# Patient Record
Sex: MALE | Race: WHITE | NOT HISPANIC OR LATINO | Employment: OTHER | URBAN - METROPOLITAN AREA
[De-identification: names, ages, dates, MRNs, and addresses within clinical notes are randomized per-mention and may not be internally consistent; named-entity substitution may affect disease eponyms.]

---

## 2017-07-19 DIAGNOSIS — E06.3 HASHIMOTO'S THYROIDITIS: ICD-10-CM

## 2017-07-19 RX ORDER — LEVOTHYROXINE SODIUM 112 UG/1
TABLET ORAL
Qty: 30 TABLET | Refills: 0 | Status: SHIPPED | OUTPATIENT
Start: 2017-07-19 | End: 2017-08-09 | Stop reason: SDUPTHER

## 2017-08-09 DIAGNOSIS — E06.3 HASHIMOTO'S THYROIDITIS: ICD-10-CM

## 2017-08-09 RX ORDER — LEVOTHYROXINE SODIUM 112 UG/1
TABLET ORAL
Qty: 90 TABLET | Refills: 0 | Status: SHIPPED | OUTPATIENT
Start: 2017-08-09 | End: 2018-02-09 | Stop reason: SDUPTHER

## 2018-02-09 DIAGNOSIS — E06.3 HASHIMOTO'S THYROIDITIS: ICD-10-CM

## 2018-02-09 RX ORDER — LEVOTHYROXINE SODIUM 112 UG/1
TABLET ORAL
Qty: 90 TABLET | Refills: 0 | Status: SHIPPED | OUTPATIENT
Start: 2018-02-09 | End: 2018-04-29 | Stop reason: SDUPTHER

## 2018-04-29 DIAGNOSIS — E06.3 HASHIMOTO'S THYROIDITIS: ICD-10-CM

## 2018-04-30 RX ORDER — LEVOTHYROXINE SODIUM 112 MCG
TABLET ORAL
Qty: 90 TABLET | Refills: 0 | Status: SHIPPED | OUTPATIENT
Start: 2018-04-30 | End: 2018-07-28 | Stop reason: SDUPTHER

## 2018-07-28 DIAGNOSIS — E06.3 HASHIMOTO'S THYROIDITIS: ICD-10-CM

## 2018-07-30 RX ORDER — LEVOTHYROXINE SODIUM 112 MCG
TABLET ORAL
Qty: 90 TABLET | Refills: 0 | Status: SHIPPED | OUTPATIENT
Start: 2018-07-30 | End: 2020-02-06

## 2018-10-14 ENCOUNTER — APPOINTMENT (OUTPATIENT)
Dept: GENERAL RADIOLOGY | Facility: HOSPITAL | Age: 53
End: 2018-10-14

## 2018-10-14 ENCOUNTER — HOSPITAL ENCOUNTER (EMERGENCY)
Facility: HOSPITAL | Age: 53
Discharge: HOME | End: 2018-10-14
Attending: EMERGENCY MEDICINE

## 2018-10-14 VITALS
RESPIRATION RATE: 18 BRPM | HEART RATE: 55 BPM | HEIGHT: 72 IN | OXYGEN SATURATION: 99 % | WEIGHT: 260 LBS | DIASTOLIC BLOOD PRESSURE: 74 MMHG | TEMPERATURE: 98.4 F | SYSTOLIC BLOOD PRESSURE: 138 MMHG | BODY MASS INDEX: 35.21 KG/M2

## 2018-10-14 DIAGNOSIS — S52.501A CLOSED FRACTURE OF DISTAL END OF RIGHT RADIUS, UNSPECIFIED FRACTURE MORPHOLOGY, INITIAL ENCOUNTER: Primary | ICD-10-CM

## 2018-10-14 PROCEDURE — 96374 THER/PROPH/DIAG INJ IV PUSH: CPT

## 2018-10-14 PROCEDURE — 96375 TX/PRO/DX INJ NEW DRUG ADDON: CPT

## 2018-10-14 PROCEDURE — 73130 X-RAY EXAM OF HAND: CPT

## 2018-10-14 PROCEDURE — 25010000002 HYDROMORPHONE 1 MG/ML SOLUTION: Performed by: NURSE PRACTITIONER

## 2018-10-14 PROCEDURE — 99284 EMERGENCY DEPT VISIT MOD MDM: CPT

## 2018-10-14 PROCEDURE — 73100 X-RAY EXAM OF WRIST: CPT

## 2018-10-14 PROCEDURE — 25010000002 ONDANSETRON PER 1 MG: Performed by: NURSE PRACTITIONER

## 2018-10-14 PROCEDURE — 73110 X-RAY EXAM OF WRIST: CPT

## 2018-10-14 PROCEDURE — 25010000002 MORPHINE PER 10 MG: Performed by: NURSE PRACTITIONER

## 2018-10-14 RX ORDER — BUPIVACAINE HYDROCHLORIDE 5 MG/ML
10 INJECTION, SOLUTION EPIDURAL; INTRACAUDAL ONCE
Status: COMPLETED | OUTPATIENT
Start: 2018-10-14 | End: 2018-10-14

## 2018-10-14 RX ORDER — HYDROCODONE BITARTRATE AND ACETAMINOPHEN 7.5; 325 MG/1; MG/1
1 TABLET ORAL EVERY 6 HOURS PRN
Qty: 12 TABLET | Refills: 0 | Status: SHIPPED | OUTPATIENT
Start: 2018-10-14 | End: 2020-02-06

## 2018-10-14 RX ORDER — ONDANSETRON 2 MG/ML
4 INJECTION INTRAMUSCULAR; INTRAVENOUS ONCE
Status: COMPLETED | OUTPATIENT
Start: 2018-10-14 | End: 2018-10-14

## 2018-10-14 RX ORDER — LIDOCAINE HYDROCHLORIDE 10 MG/ML
10 INJECTION, SOLUTION EPIDURAL; INFILTRATION; INTRACAUDAL; PERINEURAL ONCE
Status: COMPLETED | OUTPATIENT
Start: 2018-10-14 | End: 2018-10-14

## 2018-10-14 RX ORDER — ONDANSETRON 4 MG/1
4 TABLET, FILM COATED ORAL EVERY 8 HOURS PRN
Qty: 10 TABLET | Refills: 0 | Status: SHIPPED | OUTPATIENT
Start: 2018-10-14 | End: 2020-02-06

## 2018-10-14 RX ORDER — HYDROCODONE BITARTRATE AND ACETAMINOPHEN 7.5; 325 MG/1; MG/1
1 TABLET ORAL ONCE
Status: COMPLETED | OUTPATIENT
Start: 2018-10-14 | End: 2018-10-14

## 2018-10-14 RX ADMIN — LIDOCAINE HYDROCHLORIDE 5 ML: 10 INJECTION, SOLUTION EPIDURAL; INFILTRATION; INTRACAUDAL; PERINEURAL at 17:47

## 2018-10-14 RX ADMIN — ONDANSETRON 4 MG: 2 INJECTION INTRAMUSCULAR; INTRAVENOUS at 16:58

## 2018-10-14 RX ADMIN — HYDROMORPHONE HYDROCHLORIDE 1 MG: 1 INJECTION, SOLUTION INTRAMUSCULAR; INTRAVENOUS; SUBCUTANEOUS at 17:44

## 2018-10-14 RX ADMIN — BUPIVACAINE HYDROCHLORIDE 10 ML: 5 INJECTION, SOLUTION EPIDURAL; INTRACAUDAL; PERINEURAL at 17:46

## 2018-10-14 RX ADMIN — MORPHINE SULFATE 4 MG: 4 INJECTION INTRAVENOUS at 16:58

## 2018-10-14 RX ADMIN — HYDROCODONE BITARTRATE AND ACETAMINOPHEN 1 TABLET: 7.5; 325 TABLET ORAL at 18:26

## 2018-10-14 NOTE — DISCHARGE INSTRUCTIONS
ICE, ICE, ICE on and off until you see Hand Surgeon    Use sling to help with comfort    Can take over the counter Ibuprofen as needed for pain in between pain medication doses    No driving on pain meds    Return Precautions    Although you are being discharged from the ED today, I encourage you to return for worsening symptoms.  Things can, and do, change such that treatment at home with medication may not be adequate.      Specifically, return for any of the following:    Chest pain, shortness of breath, pain or nausea and vomiting not controlled by medications provided.    Please make a follow up with your Primary Care Provider for a blood pressure recheck.     YOU HAVE BEEN PRESCRIBED NARCOTIC PAIN MEDICATION    Narcotic pain medications can be addicting; only take them when needed    You have only been given a 2-3 day supply    Do not operate heavy machinery or make important decisions while taking narcotics    Take an over the counters tool softener while taking pain pills to avoid constipation    Do not consume alcohol while taking pain medication as this can be fatal    Do not share your pain medication with others    Store pain medication securely to prevent accidental exposure or death

## 2018-10-14 NOTE — ED PROVIDER NOTES
Pt presents to the ED complaining of R wrist pain that began s/p fall while roller skating earlier today. Pt's R wrist XR shows a fracture involving the distal radial metaphysis. On exam, the pt has an obvious deformity to the R wrist but is NVI distally. I agree with the plan to have NP Gael reduce the pt's R wrist prior to discharging the pt home with a referral to Dr. Fraga (hand surgery).    MD ATTESTATION NOTE    The KASSIDY and I have discussed this patient's history, physical exam, and treatment plan.  I have reviewed the documentation and personally had a face to face interaction with the patient. I affirm the documentation and agree with the treatment and plan.  The attached note describes my personal findings.    Documentation assistance provided by delano Rivera and Brianna Colin for . Information recorded by the scribe was done at my direction and has been verified and validated by me.    Brianna Colin  10/14/18 1746     Damaris Rivera  10/14/18 2006       Zhou Campoverde MD  10/15/18 8088

## 2018-10-14 NOTE — ED PROVIDER NOTES
EMERGENCY DEPARTMENT ENCOUNTER    Room Number:  35/35  Date seen:  10/14/2018  Time seen: 4:44 PM  PCP: Aristides Hobson MD    HPI:  Chief complaint: Arm pain  Context:Tristen Markham is a 53 y.o. male who presents to the ED with c/o sudden R lower arm pain that began PTA when pt fell while roller skating.     Timing: Constant  Duration: Began PTA  Location: R lower arm  Quality: Sudden onset  Intensity/Severity: Moderate  Associated Symptoms: None  Previous Episodes: None  Treatment before arrival: None    ALLERGIES  Patient has no known allergies.    PAST MEDICAL HISTORY  Active Ambulatory Problems     Diagnosis Date Noted   • Hashimoto's thyroiditis    • History of herpes genitalis    • History of onychomycosis    • Vitamin D deficiency    • External hemorrhoids 08/02/2016   • Dyslipidemia 08/02/2016   • Non morbid obesity due to excess calories 08/02/2016   • Healthcare maintenance 08/02/2016   • Papule of skin 10/04/2016     Resolved Ambulatory Problems     Diagnosis Date Noted   • Adult hypothyroidism 08/02/2016   • Accelerated idioventricular rhythm (CMS/HCC)      Past Medical History:   Diagnosis Date   • Abnormal Holter monitor finding    • Closed fracture of tibia and fibula    • Fecal occult blood test positive    • History of Hashimoto thyroiditis    • History of herpes genitalis    • History of onychomycosis    • Non morbid obesity due to excess calories 8/2/2016   • Vitamin D deficiency        PAST SURGICAL HISTORY  Past Surgical History:   Procedure Laterality Date   • TIBIA FRACTURE SURGERY  2009    titanium clau       FAMILY HISTORY  Family History   Problem Relation Age of Onset   • Breast cancer Mother    • Heart disease Father         MI at 65       SOCIAL HISTORY  Social History     Social History   • Marital status:      Spouse name: N/A   • Number of children: 1   • Years of education: N/A     Occupational History   •       Independent     Social History Main Topics   •  "Smoking status: Former Smoker   • Smokeless tobacco: Never Used      Comment: 1 ppd 18-35; quit 2005   • Alcohol use Yes      Comment: 1 drink/ month   • Drug use: No   • Sexual activity: Defer      Comment: wife only; hx of herpes     Other Topics Concern   • Not on file     Social History Narrative    Diet - \"slack a little bit, a burger junky\"; eating more veggies    Exercise - \"trying to get more\"; hiking 3x/ week       REVIEW OF SYSTEMS  Review of Systems   Constitutional: Negative for activity change, appetite change, diaphoresis and fever.   HENT: Negative for trouble swallowing.    Eyes: Negative for visual disturbance.   Respiratory: Negative for cough, chest tightness, shortness of breath and wheezing.    Cardiovascular: Negative for chest pain, palpitations and leg swelling.   Gastrointestinal: Negative for abdominal pain, diarrhea, nausea and vomiting.   Genitourinary: Negative for dysuria.   Musculoskeletal: Positive for myalgias (R lower arm pain). Negative for back pain.   Skin: Negative for rash.   Allergic/Immunologic: Negative for food allergies.   Neurological: Negative for dizziness, speech difficulty and light-headedness.       PHYSICAL EXAM  ED Triage Vitals [10/14/18 1636]   Temp Heart Rate Resp BP SpO2   99.2 °F (37.3 °C) 111 16 -- 98 %      Temp src Heart Rate Source Patient Position BP Location FiO2 (%)   Tympanic -- -- -- --     Physical Exam   Constitutional: He is oriented to person, place, and time and well-developed, well-nourished, and in no distress. No distress.   HENT:   Head: Normocephalic and atraumatic.   Eyes: Pupils are equal, round, and reactive to light.   Neck: Normal range of motion. Neck supple.   Cardiovascular: Normal rate, S1 normal, S2 normal and normal heart sounds.  Exam reveals no gallop and no friction rub.    No murmur heard.  Pulmonary/Chest: Effort normal. No respiratory distress. He has no wheezes. He has no rales. He exhibits no tenderness.   Abdominal: Soft. " There is no rebound and no guarding.   Musculoskeletal: Normal range of motion. He exhibits deformity (at R DR).   Lymphadenopathy:     He has no cervical adenopathy.   Neurological: He is alert and oriented to person, place, and time. He has normal sensation and intact cranial nerves.   Skin: Skin is warm and dry.   Psychiatric: Affect normal.   Nursing note and vitals reviewed.      RADIOLOGY  XR Wrist 2 View Right (post- reduction)   Final Result    FINDINGS: There is splinting material in position. The degree of  fracture fragment displacement is only slightly improved compared to the  prior examination. No new abnormality identified.        XR Wrist 3+ View Right (pre-reduction)   Final Result    RIGHT HAND AND WRIST FINDINGS: There is a comminuted fracture involving  the distal right radial metaphysis. The distal radial shaft is displaced  in a ventral direction in relation to the fracture fragments. The  radiocarpal articulation is preserved. Curvilinear calcific density  within the soft tissues adjacent to the triquetrum, suggesting fracture.  The remaining carpal bones have a satisfactory appearance. No metacarpal  or phalangeal fracture/dislocation. Soft tissue swelling about the  wrist, no soft tissue gas nor radiopaque foreign body.     CONCLUSION:  1. Fracture involving the distal radial metaphysis, the distal ulna is  satisfactory in appearance.  2. Soft tissue calcification along the triquetrum, likely represents  underlying fracture.      XR Hand 3+ View Right   Final Result    RIGHT HAND AND WRIST FINDINGS: There is a comminuted fracture involving  the distal right radial metaphysis. The distal radial shaft is displaced  in a ventral direction in relation to the fracture fragments. The  radiocarpal articulation is preserved. Curvilinear calcific density  within the soft tissues adjacent to the triquetrum, suggesting fracture.  The remaining carpal bones have a satisfactory appearance. No  metacarpal  or phalangeal fracture/dislocation. Soft tissue swelling about the  wrist, no soft tissue gas nor radiopaque foreign body.     CONCLUSION:  1. Fracture involving the distal radial metaphysis, the distal ulna is  satisfactory in appearance.  2. Soft tissue calcification along the triquetrum, likely represents  underlying fracture.          I ordered the above noted radiological studies and reviewed the images on the PACS system.          MEDICATIONS GIVEN IN ER  Medications   morphine injection 4 mg (4 mg Intravenous Given 10/14/18 1658)   ondansetron (ZOFRAN) injection 4 mg (4 mg Intravenous Given 10/14/18 1658)   lidocaine PF 1% (XYLOCAINE) injection 10 mL (5 mL Infiltration Given by Other 10/14/18 1747)   bupivacaine (PF) (MARCAINE) 0.5 % injection 10 mL (10 mL Injection Given by Other 10/14/18 1746)   HYDROmorphone (DILAUDID) injection 1 mg (1 mg Intravenous Given 10/14/18 1744)   HYDROcodone-acetaminophen (NORCO) 7.5-325 MG per tablet 1 tablet (1 tablet Oral Given 10/14/18 1826)       PROCEDURES  FX Dislocation  Date/Time: 10/14/2018 6:21 PM  Performed by: ELI FISHER  Authorized by: ZENAIDA ROSALES     Consent:     Consent given by:  Patient  Injury:     Injury location:  Forearm    Forearm injury location:  R forearm    Forearm fracture type: distal radial    Pre-procedure assessment:     Neurological function: normal      Distal perfusion: normal      Range of motion: reduced    Anesthesia (see MAR for exact dosages):     Anesthesia method:  Nerve block    Block needle gauge:  27 G    Block anesthetic:  Lidocaine 1% w/o epi (and marcaine 0.5% w/o epi)    Block injection procedure:  Anatomic landmarks identified, introduced needle, anatomic landmarks palpated and negative aspiration for blood    Block outcome:  Anesthesia achieved  Procedure details:     Manipulation performed: yes      Skin traction used: no      Skeletal traction used: yes      Pin inserted: no      Reduction successful:  improved.      Immobilization:  Splint    Splint type:  Thumb spica    Supplies used:  Ortho-Glass (and ACE wrap)  Post-procedure assessment:     Neurological function: normal      Distal perfusion: normal      Range of motion: improved      Patient tolerance of procedure:  Tolerated well, no immediate complications  Splint - Cast - Strapping  Date/Time: 10/14/2018 6:24 PM  Performed by: ELI FISHER  Authorized by: ZENAIDA CAMPOVERDE     Consent:     Consent obtained:  Verbal    Consent given by:  Patient    Risks discussed:  Discoloration, numbness, pain and swelling  Pre-procedure details:     Sensation:  Normal  Procedure details:     Laterality:  Right    Location:  Hand    Hand:  R hand    Strapping: no      Splint type:  Thumb spica    Supplies:  Ortho-Glass (and ACE wrap)  Post-procedure details:     Pain:  Improved    Sensation:  Normal    Patient tolerance of procedure:  Tolerated well, no immediate complications        COURSE & MEDICAL DECISION MAKING  Pertinent Imaging studies that were ordered and reviewed are noted above.  Results were reviewed/discussed with the patient and they were also made aware of online access.  Pt also made aware that some labs, such as cultures, will not be resulted during ER visit and follow up with PMD is necessary.     PROGRESS AND CONSULTS    Progress Notes:  1647 Ordered XR R hand and XR R wrist for further evaluation. Ordered morphine for pain and zofran for nausea.    6:27 PM:  Pt's right hand/right wrist X-Ray showed a fracture involving the distal radial metaphysis. Pt underwent reduction of the distal radial fracture and a splint was subsequently applied. Pt will be prescribed with rx for small amount of pain medicine for discomfort and antiemetic. Pt will be provided with f/u referral to the hand surgeon. RTER warnings given. Pt will be discharged.     Reviewed pt's history and workup with Dr. Campoverde.  After a bedside evaluation, Dr. Campoverde agrees with the plan of  "care.    The patient's history, physical exam, and lab findings were discussed with the physician, who also performed a face to face history and physical exam.  I discussed all results and noted any abnormalities with patient.  Discussed absoute need to recheck abnormalities with their family physician.  I answered any of the patient's questions.  Discussed plan for discharge, as there is no emergent indication for admission.  Pt is agreeable and understands need for follow up and repeat testing.  Pt is aware that discharge does not mean that nothing is wrong but it indicates no emergency is present and they must continue care with their family physician.  Pt is discharged with instructions to follow up with primary care doctor to have their blood pressure rechecked.       Disposition vitals:  /80   Pulse 50   Temp 99.2 °F (37.3 °C) (Tympanic)   Resp 16   Ht 182.9 cm (72\")   Wt 118 kg (260 lb)   SpO2 99%   BMI 35.26 kg/m²     DIAGNOSIS  Final diagnoses:   Closed fracture of distal end of right radius, unspecified fracture morphology, initial encounter       FOLLOW UP   Michael Fraga MD  [address] [place], [place] [id number]  [phone]      Call on Monday for Tuesday appointment at [facility] location      RX     Medication List      New Prescriptions    HYDROcodone-acetaminophen 7.5-325 MG per tablet  Commonly known as:  NORCO  Take 1 tablet by mouth Every 6 (Six) Hours As Needed for Moderate Pain .     ondansetron 4 MG tablet  Commonly known as:  ZOFRAN  Take 1 tablet by mouth Every 8 (Eight) Hours As Needed for Nausea. Take if   pain medication causes nausea          Rachel Report  Pt's RACHEL is unavailable.    Documentation assistance provided by lachelle Mariee and Gisela Prater for VENANCIO Otero.  Information recorded by the lachelle was done at my direction and has been verified and validated by me.       Gisela Prater  10/14/18 1839       Mila Mayo APRN  10/14/18 " 2019

## 2018-10-26 DIAGNOSIS — E06.3 HASHIMOTO'S THYROIDITIS: ICD-10-CM

## 2018-10-26 RX ORDER — LEVOTHYROXINE SODIUM 112 MCG
TABLET ORAL
Qty: 90 TABLET | Refills: 0 | Status: SHIPPED | OUTPATIENT
Start: 2018-10-26 | End: 2019-01-24 | Stop reason: SDUPTHER

## 2019-01-24 DIAGNOSIS — E06.3 HASHIMOTO'S THYROIDITIS: ICD-10-CM

## 2019-01-24 RX ORDER — LEVOTHYROXINE SODIUM 112 MCG
TABLET ORAL
Qty: 90 TABLET | Refills: 0 | Status: SHIPPED | OUTPATIENT
Start: 2019-01-24 | End: 2020-02-06

## 2019-04-24 DIAGNOSIS — E06.3 HASHIMOTO'S THYROIDITIS: ICD-10-CM

## 2019-04-24 RX ORDER — LEVOTHYROXINE SODIUM 112 MCG
TABLET ORAL
Qty: 90 TABLET | Refills: 0 | OUTPATIENT
Start: 2019-04-24

## 2020-02-03 DIAGNOSIS — E06.3 HASHIMOTO'S THYROIDITIS: Primary | ICD-10-CM

## 2020-02-04 LAB
T4 FREE SERPL-MCNC: 1.15 NG/DL (ref 0.93–1.7)
TSH SERPL DL<=0.005 MIU/L-ACNC: 7.8 UIU/ML (ref 0.27–4.2)

## 2020-02-06 ENCOUNTER — OFFICE VISIT (OUTPATIENT)
Dept: INTERNAL MEDICINE | Facility: CLINIC | Age: 55
End: 2020-02-06

## 2020-02-06 VITALS
BODY MASS INDEX: 31.29 KG/M2 | HEART RATE: 54 BPM | HEIGHT: 77 IN | OXYGEN SATURATION: 96 % | TEMPERATURE: 97.8 F | DIASTOLIC BLOOD PRESSURE: 90 MMHG | WEIGHT: 265 LBS | SYSTOLIC BLOOD PRESSURE: 130 MMHG

## 2020-02-06 DIAGNOSIS — E66.09 NON MORBID OBESITY DUE TO EXCESS CALORIES: ICD-10-CM

## 2020-02-06 DIAGNOSIS — E06.3 HASHIMOTO'S THYROIDITIS: Primary | ICD-10-CM

## 2020-02-06 DIAGNOSIS — R03.0 ELEVATED BLOOD PRESSURE READING WITHOUT DIAGNOSIS OF HYPERTENSION: ICD-10-CM

## 2020-02-06 DIAGNOSIS — Z00.00 HEALTHCARE MAINTENANCE: ICD-10-CM

## 2020-02-06 PROCEDURE — 99214 OFFICE O/P EST MOD 30 MIN: CPT | Performed by: INTERNAL MEDICINE

## 2020-02-06 RX ORDER — LEVOTHYROXINE SODIUM 0.12 MG/1
125 TABLET ORAL DAILY
Qty: 30 TABLET | Refills: 5 | Status: SHIPPED | OUTPATIENT
Start: 2020-02-06 | End: 2020-03-13 | Stop reason: SDUPTHER

## 2020-02-06 NOTE — PROGRESS NOTES
"Hashimoto's Thyroiditis and Labs Only      HPI  Tristen Markham is a 54 y.o. male RTC in f/u:  Has been absent from care since 2016.     1. Hypothyroidism -out of meds for last few days. Has been regular on meds.  Is on 112mcg daily. Had labs prior to today, but no other labs in a while.  Taking daily on empty stomach.  Will take with Metamucil daily.   2. External hemorrhoids - controlled on fiber daily. Feels like does well with taking fiber.   3. Obesity - has been working on increasing diet and exercise efforts. Has lost 8-9 # in last few months. Heating better. Sodas are out. Cooking at home.  Exercise is cardio with elliptical daily, but will miss a few days.  Feels like has been trying to get in better shape. Over the 4 years has been stable. NO labs done elsewhere.   4. HM - Flu \"I dont usually get it\".  Has not had any issues; is not aware of Shingrix vaccine.    Review of Systems   Constitution: Positive for weight loss (in last few weeks, has been eating better and exercising. ). Negative for chills, fever and malaise/fatigue.   Musculoskeletal: Negative for muscle weakness.   Gastrointestinal: Negative for constipation, diarrhea, nausea and vomiting.       The following portions of the patient's history were reviewed and updated as appropriate: allergies, current medications, past medical history, past social history and problem list.      Current Outpatient Medications:   •  psyllium (METAMUCIL) 58.6 % powder, Take  by mouth daily., Disp: , Rfl: 12  •  levothyroxine (SYNTHROID) 125 MCG tablet, Take 1 tablet by mouth Daily., Disp: 30 tablet, Rfl: 5    Vitals:    02/06/20 0927   BP: 130/90   Pulse: 54   Temp: 97.8 °F (36.6 °C)   SpO2: 96%   Weight: 120 kg (265 lb)   Height: 195.6 cm (77\")     Body mass index is 31.42 kg/m².    Recheck B/P: 124/ 90    Physical Exam   Constitutional: He is oriented to person, place, and time. He appears well-developed and well-nourished. No distress.   HENT:   Head: " Normocephalic and atraumatic.   Mouth/Throat: Oropharynx is clear and moist. No oropharyngeal exudate.   Eyes: Pupils are equal, round, and reactive to light.   Neck: Normal range of motion. Neck supple. Carotid bruit is not present. No thyromegaly present.   Cardiovascular: Normal rate, regular rhythm and normal heart sounds.   Pulses:       Carotid pulses are 2+ on the right side, and 2+ on the left side.       Radial pulses are 2+ on the right side, and 2+ on the left side.   Pulmonary/Chest: Effort normal and breath sounds normal. No respiratory distress. He has no wheezes. He has no rales.   Musculoskeletal: He exhibits no edema.   Neurological: He is alert and oriented to person, place, and time. No cranial nerve deficit.   Psychiatric: He has a normal mood and affect. His behavior is normal.   Vitals reviewed.      Assessment/ Plan  Diagnoses and all orders for this visit:    Hashimoto's thyroiditis  -     levothyroxine (SYNTHROID) 125 MCG tablet; Take 1 tablet by mouth Daily.    Non morbid obesity due to excess calories    Healthcare maintenance    Elevated blood pressure reading without diagnosis of hypertension        Return in about 3 months (around 5/6/2020) for Annual physical.      Discussion:  Tristen Markham is a 54 y.o. male RTC in f/u:    1. Hypothyroidism - not at goal on check today and was not at goal last check in 2016, but was lost to f/u.  Will increase to 125mcg daily today and will trend numbers at [facility]PE labs in  3 months. Take daily on an empty stomach.   2. External hemorrhoids - controlled on fiber daily. C/W same.   3. Obesity - remains active issue. Weight is stable since 2016 check, but more recently has lost weight with diet and exercise mods. Urged to c/w efforts and will trend weight at f/u.  4. Elevated blood pressure without dx of HTN - diastolic elevation persists in office today on recheck. D/W pt need for home log and pt will track. Counseled on technique.  Trend numbers and  address tx need at CPE in 3 months.   5. HM - Flu/ Shingrix at pharmacy    RTC 3 months CPE, F labs prior

## 2020-02-06 NOTE — PATIENT INSTRUCTIONS
Shingrix (new shingles shot; 2 shot series) check at pharmacy  Flu shot, consider at the pharmacy

## 2020-03-13 DIAGNOSIS — E06.3 HASHIMOTO'S THYROIDITIS: ICD-10-CM

## 2020-03-13 RX ORDER — LEVOTHYROXINE SODIUM 0.12 MG/1
125 TABLET ORAL DAILY
Qty: 30 TABLET | Refills: 5 | Status: SHIPPED | OUTPATIENT
Start: 2020-03-13 | End: 2020-04-01 | Stop reason: SDUPTHER

## 2020-04-01 DIAGNOSIS — E06.3 HASHIMOTO'S THYROIDITIS: ICD-10-CM

## 2020-04-01 RX ORDER — LEVOTHYROXINE SODIUM 0.12 MG/1
125 TABLET ORAL DAILY
Qty: 90 TABLET | Refills: 1 | Status: SHIPPED | OUTPATIENT
Start: 2020-04-01 | End: 2020-09-21

## 2020-09-21 DIAGNOSIS — E06.3 HASHIMOTO'S THYROIDITIS: ICD-10-CM

## 2020-09-21 RX ORDER — LEVOTHYROXINE SODIUM 125 MCG
TABLET ORAL
Qty: 90 TABLET | Refills: 1 | Status: SHIPPED | OUTPATIENT
Start: 2020-09-21 | End: 2021-03-15

## 2020-11-19 DIAGNOSIS — E66.09 NON MORBID OBESITY DUE TO EXCESS CALORIES: ICD-10-CM

## 2020-11-19 DIAGNOSIS — Z12.5 SCREENING FOR PROSTATE CANCER: ICD-10-CM

## 2020-11-19 DIAGNOSIS — E06.3 HASHIMOTO'S THYROIDITIS: ICD-10-CM

## 2020-11-19 DIAGNOSIS — E78.5 DYSLIPIDEMIA: ICD-10-CM

## 2020-11-19 DIAGNOSIS — E55.9 VITAMIN D DEFICIENCY: ICD-10-CM

## 2020-11-19 DIAGNOSIS — Z11.59 NEED FOR HEPATITIS C SCREENING TEST: ICD-10-CM

## 2020-11-19 DIAGNOSIS — Z00.00 HEALTHCARE MAINTENANCE: Primary | ICD-10-CM

## 2020-12-02 LAB
25(OH)D3+25(OH)D2 SERPL-MCNC: 30.9 NG/ML (ref 30–100)
ALBUMIN SERPL-MCNC: 4.7 G/DL (ref 3.5–5.2)
ALBUMIN/GLOB SERPL: 1.8 G/DL
ALP SERPL-CCNC: 87 U/L (ref 39–117)
ALT SERPL-CCNC: 30 U/L (ref 1–41)
APPEARANCE UR: CLEAR
AST SERPL-CCNC: 17 U/L (ref 1–40)
BACTERIA #/AREA URNS HPF: NORMAL /HPF
BASOPHILS # BLD AUTO: 0.08 10*3/MM3 (ref 0–0.2)
BASOPHILS NFR BLD AUTO: 1.5 % (ref 0–1.5)
BILIRUB SERPL-MCNC: 1.2 MG/DL (ref 0–1.2)
BILIRUB UR QL STRIP: NEGATIVE
BUN SERPL-MCNC: 16 MG/DL (ref 6–20)
BUN/CREAT SERPL: 15.1 (ref 7–25)
CALCIUM SERPL-MCNC: 9.8 MG/DL (ref 8.6–10.5)
CHLORIDE SERPL-SCNC: 102 MMOL/L (ref 98–107)
CHOLEST SERPL-MCNC: 188 MG/DL (ref 0–200)
CO2 SERPL-SCNC: 26.1 MMOL/L (ref 22–29)
COLOR UR: YELLOW
CREAT SERPL-MCNC: 1.06 MG/DL (ref 0.76–1.27)
EOSINOPHIL # BLD AUTO: 0.13 10*3/MM3 (ref 0–0.4)
EOSINOPHIL NFR BLD AUTO: 2.5 % (ref 0.3–6.2)
EPI CELLS #/AREA URNS HPF: NORMAL /HPF (ref 0–10)
ERYTHROCYTE [DISTWIDTH] IN BLOOD BY AUTOMATED COUNT: 12.4 % (ref 12.3–15.4)
GLOBULIN SER CALC-MCNC: 2.6 GM/DL
GLUCOSE SERPL-MCNC: 92 MG/DL (ref 65–99)
GLUCOSE UR QL: NEGATIVE
HCT VFR BLD AUTO: 48.9 % (ref 37.5–51)
HCV AB S/CO SERPL IA: <0.1 S/CO RATIO (ref 0–0.9)
HDLC SERPL-MCNC: 42 MG/DL (ref 40–60)
HGB BLD-MCNC: 16.4 G/DL (ref 13–17.7)
HGB UR QL STRIP: NEGATIVE
IMM GRANULOCYTES # BLD AUTO: 0.01 10*3/MM3 (ref 0–0.05)
IMM GRANULOCYTES NFR BLD AUTO: 0.2 % (ref 0–0.5)
KETONES UR QL STRIP: NEGATIVE
LDLC SERPL CALC-MCNC: 119 MG/DL (ref 0–100)
LEUKOCYTE ESTERASE UR QL STRIP: NEGATIVE
LYMPHOCYTES # BLD AUTO: 1.78 10*3/MM3 (ref 0.7–3.1)
LYMPHOCYTES NFR BLD AUTO: 33.6 % (ref 19.6–45.3)
MCH RBC QN AUTO: 29.6 PG (ref 26.6–33)
MCHC RBC AUTO-ENTMCNC: 33.5 G/DL (ref 31.5–35.7)
MCV RBC AUTO: 88.3 FL (ref 79–97)
MICRO URNS: NORMAL
MICRO URNS: NORMAL
MONOCYTES # BLD AUTO: 0.31 10*3/MM3 (ref 0.1–0.9)
MONOCYTES NFR BLD AUTO: 5.8 % (ref 5–12)
MUCOUS THREADS URNS QL MICRO: PRESENT /HPF
NEUTROPHILS # BLD AUTO: 2.99 10*3/MM3 (ref 1.7–7)
NEUTROPHILS NFR BLD AUTO: 56.4 % (ref 42.7–76)
NITRITE UR QL STRIP: NEGATIVE
NRBC BLD AUTO-RTO: 0 /100 WBC (ref 0–0.2)
PH UR STRIP: 5.5 [PH] (ref 5–7.5)
PLATELET # BLD AUTO: 282 10*3/MM3 (ref 140–450)
POTASSIUM SERPL-SCNC: 4.3 MMOL/L (ref 3.5–5.2)
PROT SERPL-MCNC: 7.3 G/DL (ref 6–8.5)
PROT UR QL STRIP: NEGATIVE
PSA SERPL-MCNC: 0.85 NG/ML (ref 0–4)
RBC # BLD AUTO: 5.54 10*6/MM3 (ref 4.14–5.8)
RBC #/AREA URNS HPF: NORMAL /HPF (ref 0–2)
SODIUM SERPL-SCNC: 140 MMOL/L (ref 136–145)
SP GR UR: 1.01 (ref 1–1.03)
T4 FREE SERPL-MCNC: 1.57 NG/DL (ref 0.93–1.7)
TRIGL SERPL-MCNC: 149 MG/DL (ref 0–150)
TSH SERPL DL<=0.005 MIU/L-ACNC: 2.5 UIU/ML (ref 0.27–4.2)
URINALYSIS REFLEX: NORMAL
UROBILINOGEN UR STRIP-MCNC: 0.2 MG/DL (ref 0.2–1)
VLDLC SERPL CALC-MCNC: 27 MG/DL (ref 5–40)
WBC # BLD AUTO: 5.3 10*3/MM3 (ref 3.4–10.8)
WBC #/AREA URNS HPF: NORMAL /HPF (ref 0–5)

## 2020-12-07 ENCOUNTER — OFFICE VISIT (OUTPATIENT)
Dept: INTERNAL MEDICINE | Facility: CLINIC | Age: 55
End: 2020-12-07

## 2020-12-07 VITALS
WEIGHT: 235 LBS | HEART RATE: 57 BPM | OXYGEN SATURATION: 98 % | SYSTOLIC BLOOD PRESSURE: 130 MMHG | HEIGHT: 77 IN | DIASTOLIC BLOOD PRESSURE: 78 MMHG | BODY MASS INDEX: 27.75 KG/M2 | TEMPERATURE: 96.9 F

## 2020-12-07 DIAGNOSIS — E78.5 DYSLIPIDEMIA: ICD-10-CM

## 2020-12-07 DIAGNOSIS — E55.9 VITAMIN D DEFICIENCY: ICD-10-CM

## 2020-12-07 DIAGNOSIS — Z00.00 HEALTHCARE MAINTENANCE: Primary | ICD-10-CM

## 2020-12-07 DIAGNOSIS — K64.4 EXTERNAL HEMORRHOIDS: ICD-10-CM

## 2020-12-07 DIAGNOSIS — R22.1 MASS IN NECK: ICD-10-CM

## 2020-12-07 DIAGNOSIS — Z86.19 HISTORY OF HERPES GENITALIS: ICD-10-CM

## 2020-12-07 DIAGNOSIS — E06.3 HASHIMOTO'S THYROIDITIS: ICD-10-CM

## 2020-12-07 PROBLEM — E66.3 OVERWEIGHT (BMI 25.0-29.9): Status: ACTIVE | Noted: 2020-12-07

## 2020-12-07 PROCEDURE — 99396 PREV VISIT EST AGE 40-64: CPT | Performed by: INTERNAL MEDICINE

## 2020-12-07 NOTE — PATIENT INSTRUCTIONS
Shingrix (new shingles shot; 2 shot series) check at pharmacy  Hepatitis A (2 shot series) check at pharmacy  Flu shot at pharmacy

## 2020-12-07 NOTE — PROGRESS NOTES
"Annual Exam      HPI  Tristen Markham is a 55 y.o. male RTC in yearly CPE, review of medical issues: Has really been doing well. Notes has lost some weight with no sodas, no fast food and cutting down on unhealthy foods. Is still exercising with workout in basement.  \"I feel a little better\".  Feels draggy today and really worked hard on home improvement project this weekend.   1. Hypothyroidism - taking med daily on empty stomach.  Taking 125mcg daily today.   2. External hemorrhoids - \"I am still taking fiber as needed. I still have flare ups occasionally\".  Feels like exertion or on feet all day will have some flare ups.  Will use fiber in the evening at times, 'it is kind of a balancing act'.    3. Obesity --> Overweight- remains active issue. Weight is stable since 2016 check, but more recently has lost weight with diet and exercise mods. Urged to c/w efforts and will trend weight at f/u.  4. Elevated blood pressure without dx of HTN -  Notes has been cehcking from time to time and getting 120's/ 80's.  Is not checking daily.   5. Genital herpes - no outbreaks recently.  Rare flare up, maybe once yearly . NO meds used/     Review of Systems   Constitution: Positive for weight loss (intentional, ~30#). Negative for chills, fever and malaise/fatigue.   HENT: Negative for congestion, hearing loss, odynophagia and sore throat.    Eyes: Negative for discharge, double vision, pain and redness.        Last eye exam ~2019; wears glasses; has appt   Cardiovascular: Negative for chest pain, dyspnea on exertion, irregular heartbeat, leg swelling, near-syncope, orthopnea, palpitations and syncope.   Respiratory: Negative for cough and shortness of breath.    Endocrine: Negative for polydipsia, polyphagia and polyuria.   Hematologic/Lymphatic: Negative for bleeding problem. Does not bruise/bleed easily.   Skin: Positive for dry skin (over torso, in winter only, using special soap and seems to be helping. ). Negative for " rash and suspicious lesions.   Musculoskeletal: Negative for joint pain, joint swelling, muscle cramps, muscle weakness and myalgias.   Gastrointestinal: Negative for change in bowel habit, constipation, diarrhea, dysphagia, heartburn, nausea and vomiting.   Genitourinary: Negative for dysuria, frequency, hematuria, hesitancy, incomplete emptying and nocturia (0-1 x/ ngiht).   Neurological: Negative for dizziness, headaches and light-headedness.   Psychiatric/Behavioral: Negative for depression. The patient does not have insomnia and is not nervous/anxious.    Allergic/Immunologic: Negative for environmental allergies and persistent infections.       Problem List:    Patient Active Problem List   Diagnosis   • Hashimoto's thyroiditis   • History of herpes genitalis   • History of onychomycosis   • Vitamin D deficiency   • External hemorrhoids   • Dyslipidemia   • Overweight (BMI 25.0-29.9)       Medical History:    Past Medical History:   Diagnosis Date   • Abnormal Holter monitor finding     2014; s/p stress test (-) and Cards eval with noted normal Holter on Cards review (PAC/ PVC noted)   • Closed fracture of tibia and fibula     s/p surgery with titanium clau   • Fecal occult blood test positive    • History of Hashimoto thyroiditis    • History of herpes genitalis    • History of onychomycosis    • Non morbid obesity due to excess calories 8/2/2016   • Vitamin D deficiency    • Wrist fracture 2019    Right        Social History:    Social History     Socioeconomic History   • Marital status:      Spouse name: Not on file   • Number of children: 1   • Years of education: Not on file   • Highest education level: Not on file   Occupational History   • Occupation:      Comment: Independent   Tobacco Use   • Smoking status: Former Smoker   • Smokeless tobacco: Never Used   • Tobacco comment: 1 ppd [age]; quit 2005   Substance and Sexual Activity   • Alcohol use: Yes     Comment: 1 drink/ month   •  "Drug use: No   • Sexual activity: Yes     Partners: Female     Comment: wife only; hx of herpes   Lifestyle   • Physical activity     Days per week: 4 days     Minutes per session: 60 min   • Stress: Not on file   Social History Narrative    Diet - \"slack a little bit, a burger junky\"; eating more veggies; 2020 much improved no soda, higher quality diet    Exercise - \"trying to get more\"; hiking 3x/ week; 2020 Working out 3-4x/. Week in basement gym    Caffeine - Rare soft drink       Family History:   Family History   Problem Relation Age of Onset   • Breast cancer Mother    • Heart disease Father         MI at 65       Surgical History:   Past Surgical History:   Procedure Laterality Date   • TIBIA FRACTURE SURGERY  2009    titanium clau   • WRIST FRACTURE SURGERY Right     Plate and screw; [facility]         Current Outpatient Medications:   •  psyllium (METAMUCIL) 58.6 % powder, Take  by mouth daily., Disp: , Rfl: 12  •  Synthroid 125 MCG tablet, TAKE 1 TABLET DAILY, Disp: 90 tablet, Rfl: 1    Vitals:    12/07/20 0745   BP: 130/78   Pulse: 57   Temp: 96.9 °F (36.1 °C)   SpO2: 98%     Body mass index is 27.87 kg/m².    Physical Exam  Vitals signs reviewed.   Constitutional:       General: He is not in acute distress.     Appearance: Normal appearance. He is well-developed. He is not ill-appearing or toxic-appearing.   HENT:      Head: Normocephalic and atraumatic.      Right Ear: Hearing, tympanic membrane, ear canal and external ear normal.      Left Ear: Hearing, tympanic membrane, ear canal and external ear normal.      Nose: Nose normal.      Mouth/Throat:      Mouth: Mucous membranes are moist. No oral lesions.      Tongue: No lesions.      Pharynx: Oropharynx is clear. Uvula midline. No pharyngeal swelling, oropharyngeal exudate, posterior oropharyngeal erythema or uvula swelling.   Eyes:      General: Lids are normal. No scleral icterus.        Right eye: No discharge.         Left eye: No " discharge.      Extraocular Movements: Extraocular movements intact.      Conjunctiva/sclera: Conjunctivae normal.      Pupils: Pupils are equal, round, and reactive to light.   Neck:      Musculoskeletal: Full passive range of motion without pain, normal range of motion and neck supple.      Thyroid: No thyroid mass, thyromegaly or thyroid tenderness.      Vascular: No carotid bruit.     Cardiovascular:      Rate and Rhythm: Normal rate and regular rhythm.      Pulses:           Radial pulses are 2+ on the right side and 2+ on the left side.        Dorsalis pedis pulses are 2+ on the right side and 2+ on the left side.        Posterior tibial pulses are 2+ on the right side and 2+ on the left side.      Heart sounds: Normal heart sounds, S1 normal and S2 normal. No murmur. No friction rub. No gallop.    Pulmonary:      Effort: Pulmonary effort is normal. No respiratory distress.      Breath sounds: Normal breath sounds. No wheezing, rhonchi or rales.   Abdominal:      General: Bowel sounds are normal. There is no distension.      Palpations: Abdomen is soft. There is no mass.      Tenderness: There is no abdominal tenderness. There is no guarding or rebound.   Genitourinary:     Prostate: Normal. Not enlarged and not tender.      Rectum: External hemorrhoid (numerous, non-thrombosed, no blood noted) present. Normal anal tone.   Musculoskeletal: Normal range of motion.         General: No deformity.      Right lower leg: No edema.      Left lower leg: No edema.   Lymphadenopathy:      Cervical: No cervical adenopathy.      Right cervical: No superficial, deep or posterior cervical adenopathy.     Left cervical: No superficial, deep or posterior cervical adenopathy.      Upper Body:      Right upper body: No supraclavicular, axillary or pectoral adenopathy.      Left upper body: No supraclavicular, axillary or pectoral adenopathy.   Skin:     General: Skin is warm and dry.      Findings: No rash.   Neurological:       Mental Status: He is alert and oriented to person, place, and time.      Cranial Nerves: No cranial nerve deficit.      Sensory: No sensory deficit.      Motor: No weakness, tremor, atrophy or abnormal muscle tone.      Gait: Gait normal.      Deep Tendon Reflexes: Reflexes are normal and symmetric.      Reflex Scores:       Patellar reflexes are 2+ on the right side and 2+ on the left side.       Achilles reflexes are 2+ on the right side and 2+ on the left side.  Psychiatric:         Attention and Perception: Attention normal.         Mood and Affect: Mood normal.         Speech: Speech normal.         Behavior: Behavior normal. Behavior is cooperative.         Thought Content: Thought content normal.         Assessment/ Plan  Diagnoses and all orders for this visit:    Healthcare maintenance    Hashimoto's thyroiditis  -     US Head Neck Soft Tissue; Future    External hemorrhoids    Vitamin D deficiency    Dyslipidemia    History of herpes genitalis    Mass in neck  -     US Head Neck Soft Tissue; Future        Return in about 1 year (around 12/7/2021) for Annual physical.      Discussion:  Tristen Markham is a 55 y.o. male RTC in yearly CPE, review of medical issues:  1. Hypothyroidism - TSH at goal on 125mcg levothyroxine daily on empty stomach.  C/W same dosing.   2. External hemorrhoids - partial control on fiber taken PRN.  Advised to take fiber daily for bowel regularity. OK for PRN PRep-H OTC.   3. Obesity --> Overweight - has lost ~30# with diet mods and exercise. Congratulated pt on efforts. C/W and will trend numbers.   4. Elevated blood pressure without dx of HTN - good home log.   5. Genital herpes - rare outbreaks, ~1x/ year, no meds used.  Monitor.   6. Dyslipidemia - 10 yr CR 5.8% with noted (-) stress test in 2014.  No statin indicated and pt to c/w TLC  mods as noted.   8. Dry skin on torso - wintertime issue. D/W pt OTC cream options.   9. Thyroid region mass - noted on exam, subcm nodule  centrally, non-tender.  Will get U/S to eval.   10. HM - labs d/w pt; Flu/ Hep A/ Shingrix - at pharmacy;  Tdap - UTD; GERARD/ PSA OK; C-scope (-) 2014 --> 10 years; Hep C Ab (-) 12/2020; c/w exercise     RTC in one year CPE, F labs prior

## 2020-12-14 ENCOUNTER — HOSPITAL ENCOUNTER (OUTPATIENT)
Dept: ULTRASOUND IMAGING | Facility: HOSPITAL | Age: 55
Discharge: HOME | End: 2020-12-14
Admitting: INTERNAL MEDICINE

## 2020-12-14 DIAGNOSIS — R22.1 MASS IN NECK: ICD-10-CM

## 2020-12-14 DIAGNOSIS — E06.3 HASHIMOTO'S THYROIDITIS: ICD-10-CM

## 2020-12-14 PROCEDURE — 76536 US EXAM OF HEAD AND NECK: CPT

## 2020-12-16 DIAGNOSIS — E07.89 THYROID MASS OF UNCLEAR ETIOLOGY: Primary | ICD-10-CM

## 2020-12-30 ENCOUNTER — TRANSCRIBE ORDERS (OUTPATIENT)
Dept: ADMINISTRATIVE | Facility: HOSPITAL | Age: 55
End: 2020-12-30

## 2021-01-04 ENCOUNTER — TRANSCRIBE ORDERS (OUTPATIENT)
Dept: ADMINISTRATIVE | Facility: HOSPITAL | Age: 56
End: 2021-01-04

## 2021-01-04 DIAGNOSIS — E04.1 THYROID NODULE: Primary | ICD-10-CM

## 2021-01-13 ENCOUNTER — HOSPITAL ENCOUNTER (OUTPATIENT)
Dept: ULTRASOUND IMAGING | Facility: HOSPITAL | Age: 56
Discharge: HOME | End: 2021-01-13
Admitting: OTOLARYNGOLOGY

## 2021-01-13 VITALS
DIASTOLIC BLOOD PRESSURE: 81 MMHG | TEMPERATURE: 98 F | WEIGHT: 230 LBS | BODY MASS INDEX: 28.01 KG/M2 | SYSTOLIC BLOOD PRESSURE: 133 MMHG | RESPIRATION RATE: 16 BRPM | OXYGEN SATURATION: 97 % | HEART RATE: 50 BPM | HEIGHT: 76 IN

## 2021-01-13 DIAGNOSIS — E04.1 THYROID NODULE: ICD-10-CM

## 2021-01-13 PROCEDURE — 88305 TISSUE EXAM BY PATHOLOGIST: CPT | Performed by: OTOLARYNGOLOGY

## 2021-01-13 PROCEDURE — 88173 CYTOPATH EVAL FNA REPORT: CPT | Performed by: OTOLARYNGOLOGY

## 2021-01-13 PROCEDURE — 25010000003 LIDOCAINE 1 % SOLUTION: Performed by: RADIOLOGY

## 2021-01-13 RX ORDER — LIDOCAINE HYDROCHLORIDE 10 MG/ML
10 INJECTION, SOLUTION INFILTRATION; PERINEURAL ONCE
Status: COMPLETED | OUTPATIENT
Start: 2021-01-13 | End: 2021-01-13

## 2021-01-13 RX ADMIN — LIDOCAINE HYDROCHLORIDE 4 ML: 10 INJECTION, SOLUTION INFILTRATION; PERINEURAL at 07:51

## 2021-01-13 NOTE — NURSING NOTE
Pt in xray triage for US thyroid  Pt wearing a mask; RN wearing mask and goggles for all encounters

## 2021-01-13 NOTE — H&P
Name: Tristen Markham ADMIT: 2021   : 1965  PCP: Aristides Hobson MD    MRN: [id number] LOS: 0 days   AGE/SEX: 55 y.o. male  ROOM: Room/bed info not found       Chief complaint   Patient is a 55 y.o. male presents with thyroid nodule.     Past Surgical History:  Past Surgical History:   Procedure Laterality Date   • TIBIA FRACTURE SURGERY  2009    titanium clau   • WRIST FRACTURE SURGERY Right     Plate and screw; [facility]       Past Medical History:  Past Medical History:   Diagnosis Date   • Abnormal Holter monitor finding     ; s/p stress test (-) and Cards eval with noted normal Holter on Cards review (PAC/ PVC noted)   • Closed fracture of tibia and fibula     s/p surgery with titanium clau   • Fecal occult blood test positive    • History of Hashimoto thyroiditis    • History of herpes genitalis    • History of onychomycosis    • Non morbid obesity due to excess calories 2016   • Vitamin D deficiency    • Wrist fracture 2019    Right       Home Medications:  (Not in a hospital admission)      Allergies:  Patient has no known allergies.    Family History:  Family History   Problem Relation Age of Onset   • Breast cancer Mother    • Heart disease Father         MI at 65       Social History:  Social History     Tobacco Use   • Smoking status: Former Smoker   • Smokeless tobacco: Never Used   • Tobacco comment: 1 ppd 18-35; quit    Substance Use Topics   • Alcohol use: Yes     Comment: 1 drink/ month   • Drug use: No        Objective     Physical Exam:   R/r/r, ctab    Vital Signs  Temp:  [98 °F (36.7 °C)] 98 °F (36.7 °C)  Heart Rate:  [55] 55  Resp:  [16] 16  BP: (134)/(83) 134/83    Anticipated Surgical Procedure:  Thyroid fna    The risks, benefits and alternatives of this procedure have been discussed with the patient or responsible party: Yes        Ifeanyi Santamaria MD  21  07:36 EST

## 2021-01-13 NOTE — DISCHARGE INSTRUCTIONS
EDUCATION / DISCHARGE INSTRUCTIONS  Aspiration:  An aspiration is a procedure used to take a sample of cells or tissue from a lump, mass or other area of concern.   Within a week the radiologist will send a report to your physician.  A pathologist will also examine the tissue and send a report.  THIS EDUCATION INFORMATION WAS REVIEWED PRIOR TO THE PROCEDURE AND CONSENT.  Patient initials_________________Time___07:00 AM_____________      Post Procedure:    *  Rest today (no pushing pulling or straining).   *  Slowly increase activity tomorow.    *  If you received sedation do not drive for 24 hours.   *  Keep dressing clean and dry.   *  Leave dressing on puncture site for 24 hours.    *  You may shower when dressing removed.   *  If needed, use an ice pack at the site for up to 20 minutes at a time for pain.    Call your doctor if experiencing:   *  Signs of infection such as redness, swelling, excessive pain and / or foul smelling drainage from the puncture site.   *  Chills or fever over 101 degrees (by mouth).   *  Unrelieved pain.   *  Any new or severe symptoms.      Following the procedure:     Follow-up with the ordering physician as directed.    Continue to take other medications as directed by your physician unless  otherwise instructed.   If applicable, resume taking your blood thinners or Aspirin on _January 14, 2021 after 08:00 AM__________.     If you have any concerns please call the Radiology Nurses Desk at [phone].  You are the most important factor in your recovery.  Follow the above instructions carefully.    EDUCATION /DISCHARGE INSTRUCTIONS  CT/US guided biopsy:  A biopsy is a procedure done to remove tissue for further analysis.  Before images are taken to locate the target area.  Images can be obtained using ultrasound, CT or MRI.  A physician will clean your skin with antiseptic soap, place a sterile towel around the site and administer a local anesthetic to numb the area.  The physician will  then insert a special needle.  Sometimes images are taken of the needle after it is inserted to ensure the needle is in the correct area to be biopsied.   A sample is obtained and sent to the laboratory for study.  Occasionally the laboratory is unable to make a diagnosis from the sample and the procedure may need to be repeated.  Within a week the radiologist will send a report to your physician.  A pathologist will also examine the tissue and send a report.    Risks of the procedure include but are not limited to:   *  Bleeding    *  Infection   *  Puncture of surrounding organs *  Death     *  Lung collapse if the biopsy is near the chest which may require insertion of a      chest tube to re-inflate the lung if severe.    Benefits of the procedure:  Using x-ray helps to locate the area that requires a biopsy. The procedure is less invasive than a surgical procedure, there are no large incisions and it does not require anesthesia.    Alternatives to the procedure:  A biopsy can be performed surgically.  Risks of a surgical biopsy include exposure to anesthesia, infection, excessive bleeding and injury to abdominal organs.  A benefit of surgical biopsy is the ability to see the area to be biopsied and remove of a larger piece of tissue.    THIS EDUCATION INFORMATION WAS REVIEWED PRIOR TO PROCEDURE AND CONSENT. Patient initials__________________Time__07:00 AM_________________    Post Procedure:    *  Expect the biopsy site may be tender up to one week.    *  Rest today (no pushing pulling or straining).   *  Slowly increase activity tomorrow.    *  If you received sedation do not drive for 24 hours.   *  Keep dressing clean and dry.   *  Leave dressing on puncture site for 24 hours.    *  You may shower when dressing removed.  Call your doctor if experiencing:   *  Signs of infection such as redness, swelling, excessive pain and / or foul        smelling drainage from the puncture site.   *  Chills or fever over  101 degrees (by mouth).   *  Unrelieved pain.   *  Any new or severe symptoms.   *  If experiencing sudden / severe shortness of breath or chest pain go to the       nearest emergency room.   Following the procedure:     Follow-up with the ordering physician as directed.    Continue to take other medications as directed by your physician unless    otherwise instructed.       If you have any concerns please call the Radiology Nurses Desk at [phone].  You are the most important factor in your recovery.  Follow the above instructions carefully.

## 2021-01-14 ENCOUNTER — TELEPHONE (OUTPATIENT)
Dept: INTERVENTIONAL RADIOLOGY/VASCULAR | Facility: HOSPITAL | Age: 56
End: 2021-01-14

## 2021-01-14 LAB
CYTO UR: NORMAL
CYTOLOGY INITIAL INTERPRETATION: NORMAL
LAB AP CASE REPORT: NORMAL
LAB AP DIAGNOSIS COMMENT: NORMAL
LAB AP NON-GYN SPECIMEN ADEQUACY: NORMAL
PATH REPORT.FINAL DX SPEC: NORMAL
PATH REPORT.GROSS SPEC: NORMAL

## 2021-03-14 DIAGNOSIS — E06.3 HASHIMOTO'S THYROIDITIS: ICD-10-CM

## 2021-03-15 RX ORDER — LEVOTHYROXINE SODIUM 125 MCG
TABLET ORAL
Qty: 90 TABLET | Refills: 1 | Status: SHIPPED | OUTPATIENT
Start: 2021-03-15 | End: 2021-09-03

## 2021-05-17 DIAGNOSIS — E78.5 DYSLIPIDEMIA: ICD-10-CM

## 2021-05-17 DIAGNOSIS — E06.3 HASHIMOTO'S THYROIDITIS: Primary | ICD-10-CM

## 2021-05-17 DIAGNOSIS — E66.3 OVERWEIGHT (BMI 25.0-29.9): ICD-10-CM

## 2021-05-18 LAB
ALBUMIN SERPL-MCNC: 4.8 G/DL (ref 3.5–5.2)
ALBUMIN/GLOB SERPL: 2.4 G/DL
ALP SERPL-CCNC: 85 U/L (ref 39–117)
ALT SERPL-CCNC: 17 U/L (ref 1–41)
AST SERPL-CCNC: 17 U/L (ref 1–40)
BILIRUB SERPL-MCNC: 1.3 MG/DL (ref 0–1.2)
BUN SERPL-MCNC: 15 MG/DL (ref 6–20)
BUN/CREAT SERPL: 16 (ref 7–25)
CALCIUM SERPL-MCNC: 10.1 MG/DL (ref 8.6–10.5)
CHLORIDE SERPL-SCNC: 108 MMOL/L (ref 98–107)
CO2 SERPL-SCNC: 25.8 MMOL/L (ref 22–29)
CREAT SERPL-MCNC: 0.94 MG/DL (ref 0.76–1.27)
GLOBULIN SER CALC-MCNC: 2 GM/DL
GLUCOSE SERPL-MCNC: 102 MG/DL (ref 65–99)
POTASSIUM SERPL-SCNC: 5 MMOL/L (ref 3.5–5.2)
PROT SERPL-MCNC: 6.8 G/DL (ref 6–8.5)
SODIUM SERPL-SCNC: 144 MMOL/L (ref 136–145)
TSH SERPL DL<=0.005 MIU/L-ACNC: 1.13 UIU/ML (ref 0.27–4.2)

## 2021-05-25 ENCOUNTER — TELEMEDICINE (OUTPATIENT)
Dept: INTERNAL MEDICINE | Facility: CLINIC | Age: 56
End: 2021-05-25

## 2021-05-25 DIAGNOSIS — C73 PAPILLARY THYROID CARCINOMA (HCC): Primary | ICD-10-CM

## 2021-05-25 DIAGNOSIS — R03.0 ELEVATED BLOOD PRESSURE READING WITHOUT DIAGNOSIS OF HYPERTENSION: ICD-10-CM

## 2021-05-25 DIAGNOSIS — E06.3 HASHIMOTO'S THYROIDITIS: ICD-10-CM

## 2021-05-25 PROCEDURE — 99213 OFFICE O/P EST LOW 20 MIN: CPT | Performed by: INTERNAL MEDICINE

## 2021-05-25 NOTE — PROGRESS NOTES
Thyroid Problem ([facility] thyroid labs )      HPI  Tristen Markham is a 56 y.o. male  presents in f/u via video link in Epic. This visit is occurring during the COVID 19 pandemic.    You have chosen to receive care through a telehealth visit.  Do you consent to use a video/audio connection for your medical care today? Yes    1. Hypothyroidism with new dx of focus of papillary thyroid carcinoma - long hx and has been on same dose of thyroid since had hemithyroidectomy in 2/2021.  In midst of surgery path was indeterminate. However, final path was papillary thyroid carcinoma. Is now getting serial US with ENT and so far has been good.  Told 'he thinks he got it all, there was no vascular invasion'.  Tlold to have TSH monitored here.   Of note, wife was diagnosed with same thing one month after he was. No clear exposure issues.     Review of Systems   Constitutional: Negative for chills, fever and malaise/fatigue.   Cardiovascular: Negative for dyspnea on exertion.   Endocrine: Negative for cold intolerance, heat intolerance, polydipsia and polyphagia.       The following portions of the patient's history were reviewed and updated as appropriate: allergies, current medications, past medical history, past social history and problem list.      Current Outpatient Medications:   •  psyllium (METAMUCIL) 58.6 % powder, Take  by mouth daily., Disp: , Rfl: 12  •  Synthroid 125 MCG tablet, TAKE 1 TABLET DAILY, Disp: 90 tablet, Rfl: 1    There were no vitals filed for this visit.  There is no height or weight on file to calculate BMI.      Physical Exam  Vitals reviewed.   Constitutional:       General: He is not in acute distress.     Appearance: He is well-developed.   HENT:      Head: Normocephalic.   Pulmonary:      Effort: Pulmonary effort is normal. No respiratory distress.   Neurological:      Mental Status: He is alert and oriented to person, place, and time.   Psychiatric:         Behavior: Behavior normal.         Thought  Content: Thought content normal.         Assessment/ Plan  Diagnoses and all orders for this visit:    Papillary thyroid carcinoma (CMS/HCC)    Hashimoto's thyroiditis    Elevated blood pressure reading without diagnosis of hypertension        No follow-ups on file.      Discussion:  Tristen Markham is a 56 y.o. male with hx of hypothyroidism with recent dx of papillary thyroid carcinoma T1p pN0 2/2021 after incidental nodule found on exam presents in f/u via video link in Epic.  Intraop path was inconclusive and so only hemithyroidectomy performed and no I131 tx. Given clear margins and negative node pt is on serial monitoring U/S q 6 months with ENT. TSH at 1.13 on recent labs, likely reasonable give above. Will obtain ENT note for last U/S and for any thyroglobulin levels.   Will f/u with pt after receive note for further plans.  C/W current levothyroxine dose today.     Elevated blood pressure without dx of HTN - good home log in past, needs to restart periodic checks as we will be keeping TSH in lower end of reference range.  Reviewed issue with pt.     F/U plan based on above.     Total time of visit ~22 minutes.     Addendum: Called and reviewed case with Dr. Honeycutt in ENT.  Agreed that goal is keeping TSH < 2 overall.  Is on active surveillance with him after hemithryoidectomy.  I called pt and reviewed my d/w with Dr. Honeycutt and our plan. Will get TSH in 3 months and then f/u in office for CPE in 12/2021.  Pt voiced understanding.

## 2021-07-23 ENCOUNTER — TRANSCRIBE ORDERS (OUTPATIENT)
Dept: ADMINISTRATIVE | Facility: HOSPITAL | Age: 56
End: 2021-07-23

## 2021-07-23 DIAGNOSIS — C73 THYROID CA (HCC): ICD-10-CM

## 2021-07-23 DIAGNOSIS — E04.1 THYROID NODULE: Primary | ICD-10-CM

## 2021-08-10 ENCOUNTER — HOSPITAL ENCOUNTER (OUTPATIENT)
Dept: ULTRASOUND IMAGING | Facility: HOSPITAL | Age: 56
Discharge: HOME | End: 2021-08-10
Admitting: OTOLARYNGOLOGY

## 2021-08-10 DIAGNOSIS — C73 THYROID CA (HCC): ICD-10-CM

## 2021-08-10 DIAGNOSIS — E04.1 THYROID NODULE: ICD-10-CM

## 2021-08-10 PROCEDURE — 76536 US EXAM OF HEAD AND NECK: CPT

## 2021-09-01 DIAGNOSIS — E06.3 HASHIMOTO'S THYROIDITIS: Primary | ICD-10-CM

## 2021-09-03 DIAGNOSIS — E06.3 HASHIMOTO'S THYROIDITIS: ICD-10-CM

## 2021-09-03 LAB
T4 FREE SERPL-MCNC: 1.51 NG/DL (ref 0.93–1.7)
TSH SERPL DL<=0.005 MIU/L-ACNC: 3.13 UIU/ML (ref 0.27–4.2)

## 2021-09-03 RX ORDER — LEVOTHYROXINE SODIUM 125 MCG
TABLET ORAL
Qty: 90 TABLET | Refills: 1 | Status: SHIPPED | OUTPATIENT
Start: 2021-09-03 | End: 2021-09-07

## 2021-09-07 RX ORDER — LEVOTHYROXINE SODIUM 137 UG/1
137 TABLET ORAL DAILY
Qty: 90 TABLET | Refills: 1 | Status: SHIPPED | OUTPATIENT
Start: 2021-09-07 | End: 2021-12-13 | Stop reason: SDUPTHER

## 2021-11-30 DIAGNOSIS — E66.3 OVERWEIGHT (BMI 25.0-29.9): ICD-10-CM

## 2021-11-30 DIAGNOSIS — E55.9 VITAMIN D DEFICIENCY: ICD-10-CM

## 2021-11-30 DIAGNOSIS — Z12.5 SCREENING FOR PROSTATE CANCER: ICD-10-CM

## 2021-11-30 DIAGNOSIS — C73 PAPILLARY THYROID CARCINOMA (HCC): ICD-10-CM

## 2021-11-30 DIAGNOSIS — E06.3 HASHIMOTO'S THYROIDITIS: ICD-10-CM

## 2021-11-30 DIAGNOSIS — E78.5 DYSLIPIDEMIA: ICD-10-CM

## 2021-11-30 DIAGNOSIS — Z00.00 HEALTHCARE MAINTENANCE: Primary | ICD-10-CM

## 2021-12-09 LAB
25(OH)D3+25(OH)D2 SERPL-MCNC: 30.8 NG/ML (ref 30–100)
ALBUMIN SERPL-MCNC: 4.6 G/DL (ref 3.8–4.9)
ALBUMIN/GLOB SERPL: 1.8 {RATIO} (ref 1.2–2.2)
ALP SERPL-CCNC: 87 IU/L (ref 44–121)
ALT SERPL-CCNC: 22 IU/L (ref 0–44)
APPEARANCE UR: CLEAR
AST SERPL-CCNC: 16 IU/L (ref 0–40)
BACTERIA #/AREA URNS HPF: NORMAL /[HPF]
BASOPHILS # BLD AUTO: 0.1 X10E3/UL (ref 0–0.2)
BASOPHILS NFR BLD AUTO: 1 %
BILIRUB SERPL-MCNC: 0.9 MG/DL (ref 0–1.2)
BILIRUB UR QL STRIP: NEGATIVE
BUN SERPL-MCNC: 15 MG/DL (ref 6–24)
BUN/CREAT SERPL: 14 (ref 9–20)
CALCIUM SERPL-MCNC: 10 MG/DL (ref 8.7–10.2)
CASTS URNS QL MICRO: NORMAL /LPF
CHLORIDE SERPL-SCNC: 103 MMOL/L (ref 96–106)
CHOLEST SERPL-MCNC: 211 MG/DL (ref 100–199)
CO2 SERPL-SCNC: 24 MMOL/L (ref 20–29)
COLOR UR: YELLOW
CREAT SERPL-MCNC: 1.11 MG/DL (ref 0.76–1.27)
EOSINOPHIL # BLD AUTO: 0.1 X10E3/UL (ref 0–0.4)
EOSINOPHIL NFR BLD AUTO: 1 %
EPI CELLS #/AREA URNS HPF: NORMAL /HPF (ref 0–10)
ERYTHROCYTE [DISTWIDTH] IN BLOOD BY AUTOMATED COUNT: 12.2 % (ref 11.6–15.4)
GLOBULIN SER CALC-MCNC: 2.6 G/DL (ref 1.5–4.5)
GLUCOSE SERPL-MCNC: 100 MG/DL (ref 65–99)
GLUCOSE UR QL: NEGATIVE
HCT VFR BLD AUTO: 47.4 % (ref 37.5–51)
HDLC SERPL-MCNC: 43 MG/DL
HGB BLD-MCNC: 16 G/DL (ref 13–17.7)
HGB UR QL STRIP: NEGATIVE
IMM GRANULOCYTES # BLD AUTO: 0 X10E3/UL (ref 0–0.1)
IMM GRANULOCYTES NFR BLD AUTO: 0 %
KETONES UR QL STRIP: NEGATIVE
LDLC SERPL CALC-MCNC: 140 MG/DL (ref 0–99)
LEUKOCYTE ESTERASE UR QL STRIP: NEGATIVE
LYMPHOCYTES # BLD AUTO: 1.7 X10E3/UL (ref 0.7–3.1)
LYMPHOCYTES NFR BLD AUTO: 29 %
MCH RBC QN AUTO: 29.9 PG (ref 26.6–33)
MCHC RBC AUTO-ENTMCNC: 33.8 G/DL (ref 31.5–35.7)
MCV RBC AUTO: 88 FL (ref 79–97)
MICRO URNS: NORMAL
MICRO URNS: NORMAL
MONOCYTES # BLD AUTO: 0.4 X10E3/UL (ref 0.1–0.9)
MONOCYTES NFR BLD AUTO: 7 %
NEUTROPHILS # BLD AUTO: 3.7 X10E3/UL (ref 1.4–7)
NEUTROPHILS NFR BLD AUTO: 62 %
NITRITE UR QL STRIP: NEGATIVE
PH UR STRIP: 5.5 [PH] (ref 5–7.5)
PLATELET # BLD AUTO: 312 X10E3/UL (ref 150–450)
POTASSIUM SERPL-SCNC: 4.6 MMOL/L (ref 3.5–5.2)
PROT SERPL-MCNC: 7.2 G/DL (ref 6–8.5)
PROT UR QL STRIP: NEGATIVE
PSA SERPL-MCNC: 0.8 NG/ML (ref 0–4)
RBC # BLD AUTO: 5.36 X10E6/UL (ref 4.14–5.8)
RBC #/AREA URNS HPF: NORMAL /HPF (ref 0–2)
SODIUM SERPL-SCNC: 143 MMOL/L (ref 134–144)
SP GR UR: 1.02 (ref 1–1.03)
T4 FREE SERPL-MCNC: 1.66 NG/DL (ref 0.82–1.77)
TRIGL SERPL-MCNC: 153 MG/DL (ref 0–149)
TSH SERPL DL<=0.005 MIU/L-ACNC: 0.4 UIU/ML (ref 0.45–4.5)
URINALYSIS REFLEX: NORMAL
UROBILINOGEN UR STRIP-MCNC: 0.2 MG/DL (ref 0.2–1)
VLDLC SERPL CALC-MCNC: 28 MG/DL (ref 5–40)
WBC # BLD AUTO: 5.9 X10E3/UL (ref 3.4–10.8)
WBC #/AREA URNS HPF: NORMAL /HPF (ref 0–5)

## 2021-12-13 ENCOUNTER — OFFICE VISIT (OUTPATIENT)
Dept: INTERNAL MEDICINE | Facility: CLINIC | Age: 56
End: 2021-12-13

## 2021-12-13 VITALS
SYSTOLIC BLOOD PRESSURE: 120 MMHG | WEIGHT: 259 LBS | HEIGHT: 76 IN | DIASTOLIC BLOOD PRESSURE: 86 MMHG | RESPIRATION RATE: 12 BRPM | TEMPERATURE: 97.1 F | OXYGEN SATURATION: 98 % | HEART RATE: 60 BPM | BODY MASS INDEX: 31.54 KG/M2

## 2021-12-13 DIAGNOSIS — E78.5 DYSLIPIDEMIA: ICD-10-CM

## 2021-12-13 DIAGNOSIS — Z00.00 HEALTHCARE MAINTENANCE: Primary | ICD-10-CM

## 2021-12-13 DIAGNOSIS — R73.01 IFG (IMPAIRED FASTING GLUCOSE): ICD-10-CM

## 2021-12-13 DIAGNOSIS — E06.3 HASHIMOTO'S THYROIDITIS: ICD-10-CM

## 2021-12-13 DIAGNOSIS — C73 PAPILLARY THYROID CARCINOMA (HCC): ICD-10-CM

## 2021-12-13 DIAGNOSIS — R03.0 ELEVATED BLOOD PRESSURE READING IN OFFICE WITHOUT DIAGNOSIS OF HYPERTENSION: ICD-10-CM

## 2021-12-13 DIAGNOSIS — Z86.19 HISTORY OF HERPES GENITALIS: ICD-10-CM

## 2021-12-13 DIAGNOSIS — E55.9 VITAMIN D DEFICIENCY: ICD-10-CM

## 2021-12-13 DIAGNOSIS — K64.4 EXTERNAL HEMORRHOIDS: ICD-10-CM

## 2021-12-13 DIAGNOSIS — E66.9 OBESITY (BMI 30.0-34.9): ICD-10-CM

## 2021-12-13 PROBLEM — E66.811 OBESITY (BMI 30.0-34.9): Status: ACTIVE | Noted: 2020-12-07

## 2021-12-13 PROCEDURE — 99396 PREV VISIT EST AGE 40-64: CPT | Performed by: INTERNAL MEDICINE

## 2021-12-13 RX ORDER — LEVOTHYROXINE SODIUM 137 UG/1
TABLET ORAL
Qty: 90 TABLET | Refills: 1 | Status: SHIPPED | OUTPATIENT
Start: 2021-12-13

## 2021-12-13 RX ORDER — DIPHENOXYLATE HYDROCHLORIDE AND ATROPINE SULFATE 2.5; .025 MG/1; MG/1
TABLET ORAL DAILY
COMMUNITY

## 2021-12-13 RX ORDER — CHOLECALCIFEROL (VITAMIN D3) 25 MCG
CAPSULE ORAL
COMMUNITY

## 2021-12-13 NOTE — PROGRESS NOTES
"Annual Exam (review of medical issues )      HPI  Tristen Markham is a 56 y.o. male RTC in yearly CPE, review of medical issues:  1. Hypothyroidism -  on 137mcg levothyroxine daily on empty stomach.  Very rare missed doses.   2. External hemorrhoids - partial control on fiber taken PRN.  Advised to take fiber daily for bowel regularity. OK for PRN PRep-H OTC.   3. Obesity --> Overweight - has gained some weight. 'I fell off the wagon'.  \"I need to get back on it'. Started drinking soft drinks and 'stuff I should not be doing\". Will had a soda a day.  Off exercise routine.   4. Elevated blood pressure without dx of HTN - no home log.   5. Genital herpes - rare outbreaks, ~1x/ year.  None in last year.   6. Papillary Thyroid Carcinoma - dx in  1/2021, s/p partial thryoidectomy.  Had US in 8/2021 and was reassuring. Told to see ENT in one year.         Review of Systems   Constitutional: Positive for weight gain. Negative for chills, fever and malaise/fatigue.   HENT: Negative for congestion, hearing loss, odynophagia and sore throat.    Eyes: Negative for discharge, double vision, pain and redness.        Last eye exam ~10/2021; wears glasses     Cardiovascular: Negative for chest pain, dyspnea on exertion, irregular heartbeat, leg swelling, near-syncope, palpitations and syncope.   Respiratory: Negative for cough, shortness of breath, sleep disturbances due to breathing and snoring.    Endocrine: Negative for polydipsia, polyphagia and polyuria.   Hematologic/Lymphatic: Negative for bleeding problem. Does not bruise/bleed easily.   Skin: Negative for rash and suspicious lesions.   Musculoskeletal: Negative for joint pain, joint swelling (R knee, in middle of night a few weeks ago. Resolved on own in a few days. At old injury site.), muscle cramps, muscle weakness and myalgias.        Hardware in R leg, distant hx of surgery.    Gastrointestinal: Positive for hematochezia (rare events, small red blood after BM. ). " Negative for constipation, diarrhea, dysphagia, heartburn, melena, nausea and vomiting.        Can 'swallow weird' more now since thyroid surgery. Rare events, but noticed more after surgery.        Genitourinary: Negative for bladder incontinence, dysuria, frequency, hematuria, hesitancy and incomplete emptying.   Neurological: Negative for excessive daytime sleepiness, dizziness, headaches and light-headedness.   Psychiatric/Behavioral: Negative for depression and substance abuse. The patient does not have insomnia and is not nervous/anxious.    Allergic/Immunologic: Negative for environmental allergies and persistent infections.       Problem List:    Patient Active Problem List   Diagnosis   • Hashimoto's thyroiditis   • History of herpes genitalis   • History of onychomycosis   • Vitamin D deficiency   • External hemorrhoids   • Dyslipidemia   • Obesity (BMI 30.0-34.9)   • Papillary thyroid carcinoma (HCC)   • IFG (impaired fasting glucose)       Medical History:    Past Medical History:   Diagnosis Date   • Abnormal Holter monitor finding     2014; s/p stress test (-) and Cards eval with noted normal Holter on Cards review (PAC/ PVC noted)   • Closed fracture of tibia and fibula     s/p surgery with titanium clau   • Fecal occult blood test positive    • History of Hashimoto thyroiditis    • History of herpes genitalis    • History of onychomycosis    • Non morbid obesity due to excess calories 8/2/2016   • Vitamin D deficiency    • Wrist fracture 2019    Right        Social History:    Social History     Socioeconomic History   • Marital status:    • Number of children: 1   Tobacco Use   • Smoking status: Former Smoker   • Smokeless tobacco: Never Used   • Tobacco comment: 1 ppd [age]; quit 2005   Vaping Use   • Vaping Use: Never used   Substance and Sexual Activity   • Alcohol use: Yes     Comment: 1 drink/ month   • Drug use: No   • Sexual activity: Yes     Partners: Female     Comment: wife only; hx  of herpes       Family History:   Family History   Problem Relation Age of Onset   • Breast cancer Mother    • Heart disease Father         MI at 65       Surgical History:   Past Surgical History:   Procedure Laterality Date   • THYROIDECTOMY, PARTIAL Right 02/2021    new dx of Papillary thyroid carcinoma   • TIBIA FRACTURE SURGERY  2009    titanium clau   • US GUIDED FINE NEEDLE ASPIRATION  1/13/2021   • WRIST FRACTURE SURGERY Right     Plate and screw; [facility]         Current Outpatient Medications:   •  Cholecalciferol (Vitamin D-3) 25 MCG (1000 UT) capsule, Take  by mouth., Disp: , Rfl:   •  levothyroxine (SYNTHROID, LEVOTHROID) 137 MCG tablet, Take one PO daily except one half pill on Monday, Disp: 90 tablet, Rfl: 1  •  multivitamin (THERAGRAN) tablet tablet, Take  by mouth Daily., Disp: , Rfl:   •  psyllium (METAMUCIL) 58.6 % powder, Take  by mouth daily., Disp: , Rfl: 12    Vitals:    12/13/21 1252   BP: 120/86   Pulse: 60   Resp: 12   Temp: 97.1 °F (36.2 °C)   SpO2: 98%     Body mass index is 31.53 kg/m².    Physical Exam  Vitals reviewed.   Constitutional:       General: He is not in acute distress.     Appearance: Normal appearance. He is well-developed. He is obese. He is not ill-appearing or toxic-appearing.   HENT:      Head: Normocephalic and atraumatic.      Right Ear: Hearing, tympanic membrane, ear canal and external ear normal. There is no impacted cerumen.      Left Ear: Hearing, tympanic membrane, ear canal and external ear normal. There is no impacted cerumen.      Nose: Nose normal.      Mouth/Throat:      Mouth: Mucous membranes are moist. No oral lesions.      Tongue: No lesions.      Pharynx: Oropharynx is clear. Uvula midline. No pharyngeal swelling, oropharyngeal exudate, posterior oropharyngeal erythema or uvula swelling.   Eyes:      General: Lids are normal. No scleral icterus.        Right eye: No discharge.         Left eye: No discharge.      Extraocular Movements:  Extraocular movements intact.      Conjunctiva/sclera: Conjunctivae normal.      Pupils: Pupils are equal, round, and reactive to light.   Neck:      Thyroid: No thyroid mass, thyromegaly or thyroid tenderness.      Vascular: No carotid bruit.   Cardiovascular:      Rate and Rhythm: Normal rate and regular rhythm.      Pulses:           Radial pulses are 2+ on the right side and 2+ on the left side.        Dorsalis pedis pulses are 2+ on the right side and 2+ on the left side.        Posterior tibial pulses are 2+ on the right side and 2+ on the left side.      Heart sounds: Normal heart sounds, S1 normal and S2 normal. No murmur heard.  No friction rub. No gallop.    Pulmonary:      Effort: Pulmonary effort is normal. No respiratory distress.      Breath sounds: Normal breath sounds. No wheezing, rhonchi or rales.   Chest:   Breasts:      Right: No axillary adenopathy or supraclavicular adenopathy.      Left: No axillary adenopathy or supraclavicular adenopathy.       Abdominal:      General: Bowel sounds are normal. There is no distension.      Palpations: Abdomen is soft. There is no mass.      Tenderness: There is no abdominal tenderness. There is no guarding or rebound.   Genitourinary:     Prostate: Normal. Not enlarged and not tender.      Rectum: External hemorrhoid (non-thrombosed) present. Normal anal tone.   Musculoskeletal:         General: No deformity. Normal range of motion.      Cervical back: Full passive range of motion without pain, normal range of motion and neck supple.      Right lower leg: No edema.      Left lower leg: No edema.   Lymphadenopathy:      Cervical: No cervical adenopathy.      Right cervical: No superficial, deep or posterior cervical adenopathy.     Left cervical: No superficial, deep or posterior cervical adenopathy.      Upper Body:      Right upper body: No supraclavicular, axillary or pectoral adenopathy.      Left upper body: No supraclavicular, axillary or pectoral  adenopathy.   Skin:     General: Skin is warm and dry.      Findings: No rash.   Neurological:      Mental Status: He is alert and oriented to person, place, and time.      Cranial Nerves: No cranial nerve deficit.      Sensory: No sensory deficit.      Motor: No weakness, tremor, atrophy or abnormal muscle tone.      Gait: Gait normal.      Deep Tendon Reflexes: Reflexes are normal and symmetric.      Reflex Scores:       Patellar reflexes are 2+ on the right side and 2+ on the left side.       Achilles reflexes are 2+ on the right side and 2+ on the left side.  Psychiatric:         Attention and Perception: Attention normal.         Mood and Affect: Mood normal.         Speech: Speech normal.         Behavior: Behavior normal. Behavior is cooperative.         Thought Content: Thought content normal.         Assessment/ Plan  Diagnoses and all orders for this visit:    Healthcare maintenance    Hashimoto's thyroiditis  -     levothyroxine (SYNTHROID, LEVOTHROID) 137 MCG tablet; Take one PO daily except one half pill on Monday    Papillary thyroid carcinoma (HCC)  -     levothyroxine (SYNTHROID, LEVOTHROID) 137 MCG tablet; Take one PO daily except one half pill on Monday    History of herpes genitalis    Vitamin D deficiency    External hemorrhoids    Dyslipidemia    Obesity (BMI 30.0-34.9)    Elevated blood pressure reading in office without diagnosis of hypertension    IFG (impaired fasting glucose)    Other orders  -     multivitamin (THERAGRAN) tablet tablet; Take  by mouth Daily.  -     Cholecalciferol (Vitamin D-3) 25 MCG (1000 UT) capsule; Take  by mouth.        Return in about 9 weeks (around 2/14/2022) for Recheck.      Discussion:  Tristen Markham is a 56 y.o. male RTC in yearly CPE, review of medical issues:  1. Hypothyroidism - TSH overcontrolled on 137mcg levothyroxine daily on empty stomach.  Change to one pill PO daily with 1/2 tab on Monday. Recheck in 8 weeks.   2. External hemorrhoids - on fiber  daily. PRN PRep-H OTC.   3. Obesity --> Overweight --> Obesity - gained ~14# back on soft drinks daily.. Urged pt for elimination of sodas and weight loss with increase exercise.  Trend with A1C next labs.   4. Elevated blood pressure without dx of HTN - no home log. Numbers borderline today. Pt agrees to weight loss plan and will assess blood pressure in office in 8 weeks along with home log. Cuff and log to next visit.   5. Genital herpes - rare outbreaks, ~1x/ year.   6. Dyslipidemia - 10 yr CR 5.6% with noted (-) stress test in 2014.  No statin indicated and pt to restart TLC mods as noted.   7. Papillary Thyroid Carcinoma - dx in 1/2021, s/p partial thryoidectomy.  US 8/2021 and was reassuring. F/U ENT annually.   8. BRBPR - rare events, small blood after BM. Thought due to hemorrhoids.  CBC OK without signs of large volume blood loss. C/W daily fiber for now. Check FOBT.  9. HM - labs d/w pt; COVID/ Tdap -UTD; Flu/ Hep A/ Shingrix/ COVID booster - at pharmacy; GERARD/ PSA OK; C-scope (-) 2014 --> 10 years; Hep C Ab (-) 12/2020; c/w exercise    RTC 9 weeks, F labs prior (TSH, BMP, A1C)

## 2022-12-06 ENCOUNTER — TRANSCRIBE ORDERS (OUTPATIENT)
Dept: ADMINISTRATIVE | Facility: HOSPITAL | Age: 57
End: 2022-12-06

## 2022-12-06 DIAGNOSIS — Z85.850 PERSONAL HISTORY OF MALIGNANT NEOPLASM OF THYROID: ICD-10-CM

## 2022-12-06 DIAGNOSIS — E03.9 MYXEDEMA HEART DISEASE: Primary | ICD-10-CM

## 2022-12-06 DIAGNOSIS — I51.9 MYXEDEMA HEART DISEASE: Primary | ICD-10-CM

## 2022-12-12 ENCOUNTER — HOSPITAL ENCOUNTER (OUTPATIENT)
Dept: ULTRASOUND IMAGING | Facility: HOSPITAL | Age: 57
Discharge: HOME | End: 2022-12-12
Admitting: INTERNAL MEDICINE

## 2022-12-12 DIAGNOSIS — E03.9 MYXEDEMA HEART DISEASE: ICD-10-CM

## 2022-12-12 DIAGNOSIS — Z85.850 PERSONAL HISTORY OF MALIGNANT NEOPLASM OF THYROID: ICD-10-CM

## 2022-12-12 DIAGNOSIS — I51.9 MYXEDEMA HEART DISEASE: ICD-10-CM

## 2022-12-12 PROCEDURE — 76536 US EXAM OF HEAD AND NECK: CPT

## 2023-09-19 ENCOUNTER — TRANSCRIBE ORDERS (OUTPATIENT)
Dept: ADMINISTRATIVE | Facility: HOSPITAL | Age: 58
End: 2023-09-19
Payer: COMMERCIAL

## 2023-09-19 DIAGNOSIS — R31.0 GROSS HEMATURIA: Primary | ICD-10-CM

## 2023-09-27 ENCOUNTER — HOSPITAL ENCOUNTER (OUTPATIENT)
Dept: CT IMAGING | Facility: HOSPITAL | Age: 58
Discharge: HOME | End: 2023-09-27
Admitting: INTERNAL MEDICINE
Payer: COMMERCIAL

## 2023-09-27 DIAGNOSIS — R31.0 GROSS HEMATURIA: ICD-10-CM

## 2023-09-27 PROCEDURE — 25510000001 IOPAMIDOL 61 % SOLUTION: Performed by: INTERNAL MEDICINE

## 2023-09-27 PROCEDURE — 74178 CT ABD&PLV WO CNTR FLWD CNTR: CPT

## 2023-09-27 RX ADMIN — IOPAMIDOL 85 ML: 612 INJECTION, SOLUTION INTRAVENOUS at 13:16

## 2023-10-18 ENCOUNTER — LAB REQUISITION (OUTPATIENT)
Dept: LAB | Facility: HOSPITAL | Age: 58
End: 2023-10-18
Payer: COMMERCIAL

## 2023-10-18 DIAGNOSIS — N32.9 BLADDER DISORDER, UNSPECIFIED: ICD-10-CM

## 2023-10-18 PROCEDURE — 88307 TISSUE EXAM BY PATHOLOGIST: CPT | Performed by: UROLOGY

## 2023-10-19 LAB
LAB AP CASE REPORT: NORMAL
LAB AP SYNOPTIC CHECKLIST: NORMAL
PATH REPORT.FINAL DX SPEC: NORMAL
PATH REPORT.GROSS SPEC: NORMAL

## 2025-02-24 ENCOUNTER — TRANSCRIBE ORDERS (OUTPATIENT)
Dept: ADMINISTRATIVE | Facility: HOSPITAL | Age: 60
End: 2025-02-24
Payer: COMMERCIAL

## 2025-02-24 DIAGNOSIS — Z85.850 PERSONAL HISTORY OF MALIGNANT NEOPLASM OF THYROID: Primary | ICD-10-CM

## 2025-02-28 ENCOUNTER — HOSPITAL ENCOUNTER (OUTPATIENT)
Dept: ULTRASOUND IMAGING | Facility: HOSPITAL | Age: 60
Discharge: HOME | End: 2025-02-28
Admitting: INTERNAL MEDICINE
Payer: COMMERCIAL

## 2025-02-28 DIAGNOSIS — Z85.850 PERSONAL HISTORY OF MALIGNANT NEOPLASM OF THYROID: ICD-10-CM

## 2025-02-28 PROCEDURE — 76536 US EXAM OF HEAD AND NECK: CPT
